# Patient Record
Sex: MALE | Race: WHITE | NOT HISPANIC OR LATINO | URBAN - METROPOLITAN AREA
[De-identification: names, ages, dates, MRNs, and addresses within clinical notes are randomized per-mention and may not be internally consistent; named-entity substitution may affect disease eponyms.]

---

## 2018-01-05 ENCOUNTER — EMERGENCY (EMERGENCY)
Facility: HOSPITAL | Age: 48
LOS: 1 days | Discharge: HOME | End: 2018-01-05
Attending: EMERGENCY MEDICINE | Admitting: EMERGENCY MEDICINE
Payer: COMMERCIAL

## 2018-01-05 VITALS
TEMPERATURE: 98 F | HEART RATE: 56 BPM | RESPIRATION RATE: 16 BRPM | WEIGHT: 188.05 LBS | DIASTOLIC BLOOD PRESSURE: 80 MMHG | OXYGEN SATURATION: 96 % | SYSTOLIC BLOOD PRESSURE: 128 MMHG

## 2018-01-05 VITALS
DIASTOLIC BLOOD PRESSURE: 70 MMHG | RESPIRATION RATE: 16 BRPM | SYSTOLIC BLOOD PRESSURE: 110 MMHG | OXYGEN SATURATION: 99 % | TEMPERATURE: 98 F | HEART RATE: 55 BPM

## 2018-01-05 LAB
ALBUMIN SERPL ELPH-MCNC: 3.8 G/DL — SIGNIFICANT CHANGE UP (ref 3.3–5)
ALP SERPL-CCNC: 49 U/L — SIGNIFICANT CHANGE UP (ref 30–120)
ALT FLD-CCNC: 46 U/L DA — SIGNIFICANT CHANGE UP (ref 10–60)
ANION GAP SERPL CALC-SCNC: 8 MMOL/L — SIGNIFICANT CHANGE UP (ref 5–17)
AST SERPL-CCNC: 25 U/L — SIGNIFICANT CHANGE UP (ref 10–40)
BASOPHILS # BLD AUTO: 0.1 K/UL — SIGNIFICANT CHANGE UP (ref 0–0.2)
BASOPHILS NFR BLD AUTO: 1.8 % — SIGNIFICANT CHANGE UP (ref 0–2)
BILIRUB SERPL-MCNC: 0.2 MG/DL — SIGNIFICANT CHANGE UP (ref 0.2–1.2)
BUN SERPL-MCNC: 17 MG/DL — SIGNIFICANT CHANGE UP (ref 7–23)
CALCIUM SERPL-MCNC: 9.2 MG/DL — SIGNIFICANT CHANGE UP (ref 8.4–10.5)
CHLORIDE SERPL-SCNC: 104 MMOL/L — SIGNIFICANT CHANGE UP (ref 96–108)
CK MB BLD-MCNC: 0.8 % — SIGNIFICANT CHANGE UP (ref 0–3.5)
CK MB CFR SERPL CALC: 1.2 NG/ML — SIGNIFICANT CHANGE UP (ref 0–3.6)
CK SERPL-CCNC: 153 U/L — SIGNIFICANT CHANGE UP (ref 39–308)
CO2 SERPL-SCNC: 28 MMOL/L — SIGNIFICANT CHANGE UP (ref 22–31)
CREAT SERPL-MCNC: 0.91 MG/DL — SIGNIFICANT CHANGE UP (ref 0.5–1.3)
EOSINOPHIL # BLD AUTO: 0.4 K/UL — SIGNIFICANT CHANGE UP (ref 0–0.5)
EOSINOPHIL NFR BLD AUTO: 5.3 % — SIGNIFICANT CHANGE UP (ref 0–6)
GLUCOSE SERPL-MCNC: 92 MG/DL — SIGNIFICANT CHANGE UP (ref 70–99)
HCT VFR BLD CALC: 42.7 % — SIGNIFICANT CHANGE UP (ref 39–50)
HGB BLD-MCNC: 14.1 G/DL — SIGNIFICANT CHANGE UP (ref 13–17)
LYMPHOCYTES # BLD AUTO: 3.2 K/UL — SIGNIFICANT CHANGE UP (ref 1–3.3)
LYMPHOCYTES # BLD AUTO: 45 % — HIGH (ref 13–44)
MCHC RBC-ENTMCNC: 31.1 PG — SIGNIFICANT CHANGE UP (ref 27–34)
MCHC RBC-ENTMCNC: 33 GM/DL — SIGNIFICANT CHANGE UP (ref 32–36)
MCV RBC AUTO: 94.4 FL — SIGNIFICANT CHANGE UP (ref 80–100)
MONOCYTES # BLD AUTO: 0.6 K/UL — SIGNIFICANT CHANGE UP (ref 0–0.9)
MONOCYTES NFR BLD AUTO: 8.4 % — SIGNIFICANT CHANGE UP (ref 2–14)
NEUTROPHILS # BLD AUTO: 2.8 K/UL — SIGNIFICANT CHANGE UP (ref 1.8–7.4)
NEUTROPHILS NFR BLD AUTO: 39.5 % — LOW (ref 43–77)
PLATELET # BLD AUTO: 252 K/UL — SIGNIFICANT CHANGE UP (ref 150–400)
POTASSIUM SERPL-MCNC: 4.2 MMOL/L — SIGNIFICANT CHANGE UP (ref 3.5–5.3)
POTASSIUM SERPL-SCNC: 4.2 MMOL/L — SIGNIFICANT CHANGE UP (ref 3.5–5.3)
PROT SERPL-MCNC: 6.6 G/DL — SIGNIFICANT CHANGE UP (ref 6–8.3)
RBC # BLD: 4.53 M/UL — SIGNIFICANT CHANGE UP (ref 4.2–5.8)
RBC # FLD: 12.2 % — SIGNIFICANT CHANGE UP (ref 10.3–14.5)
SODIUM SERPL-SCNC: 140 MMOL/L — SIGNIFICANT CHANGE UP (ref 135–145)
TROPONIN I SERPL-MCNC: 0 NG/ML — LOW (ref 0.02–0.06)
WBC # BLD: 7.2 K/UL — SIGNIFICANT CHANGE UP (ref 3.8–10.5)
WBC # FLD AUTO: 7.2 K/UL — SIGNIFICANT CHANGE UP (ref 3.8–10.5)

## 2018-01-05 PROCEDURE — 85027 COMPLETE CBC AUTOMATED: CPT

## 2018-01-05 PROCEDURE — 80053 COMPREHEN METABOLIC PANEL: CPT

## 2018-01-05 PROCEDURE — 84484 ASSAY OF TROPONIN QUANT: CPT

## 2018-01-05 PROCEDURE — 36415 COLL VENOUS BLD VENIPUNCTURE: CPT

## 2018-01-05 PROCEDURE — 99285 EMERGENCY DEPT VISIT HI MDM: CPT

## 2018-01-05 PROCEDURE — 82553 CREATINE MB FRACTION: CPT

## 2018-01-05 PROCEDURE — 82550 ASSAY OF CK (CPK): CPT

## 2018-01-05 PROCEDURE — 99283 EMERGENCY DEPT VISIT LOW MDM: CPT | Mod: 25

## 2018-01-05 RX ORDER — LOSARTAN/HYDROCHLOROTHIAZIDE 100MG-25MG
1 TABLET ORAL
Qty: 0 | Refills: 0 | COMMUNITY

## 2018-01-05 RX ORDER — SODIUM CHLORIDE 9 MG/ML
3 INJECTION INTRAMUSCULAR; INTRAVENOUS; SUBCUTANEOUS ONCE
Qty: 0 | Refills: 0 | Status: COMPLETED | OUTPATIENT
Start: 2018-01-05 | End: 2018-01-05

## 2018-01-05 RX ORDER — ASPIRIN/CALCIUM CARB/MAGNESIUM 324 MG
325 TABLET ORAL ONCE
Qty: 0 | Refills: 0 | Status: COMPLETED | OUTPATIENT
Start: 2018-01-05 | End: 2018-01-05

## 2018-01-05 RX ADMIN — SODIUM CHLORIDE 3 MILLILITER(S): 9 INJECTION INTRAMUSCULAR; INTRAVENOUS; SUBCUTANEOUS at 20:05

## 2018-01-05 RX ADMIN — Medication 30 MILLILITER(S): at 20:05

## 2018-01-05 NOTE — ED ADULT TRIAGE NOTE - CHIEF COMPLAINT QUOTE
"I passed out in a restaurant. I think I vasovagalled" Patient had nausea, prior and had 3 drinks prior to the syncope but had not really eaten yet and began to feel nausea and dizziness.

## 2018-01-05 NOTE — ED PROVIDER NOTE - OBJECTIVE STATEMENT
48yo male bib ems s/p syncopal episode. pt states he has hx of vagal syncope, has been worked up in the past with no findings, tonite admits to drinking several glasses of champagne, going out to dinner and then feeling dizzy and lightheaded and passing out, no head trauma, pt woke up with no chest pain, no dizziness, no other compalints

## 2023-02-20 NOTE — ED ADULT TRIAGE NOTE - PAIN: PRESENCE, MLM
Notification via portal   Thank you for following through with the ultrasound of the abdomen.  No longer have fatty liver, your liver is mildly enlarged.  You have gallstones that are not needing intervention.
denies pain/discomfort

## 2023-05-24 ENCOUNTER — EMERGENCY (EMERGENCY)
Facility: HOSPITAL | Age: 53
LOS: 0 days | Discharge: HOME | End: 2023-05-24
Attending: EMERGENCY MEDICINE
Payer: COMMERCIAL

## 2023-05-24 VITALS
TEMPERATURE: 98 F | OXYGEN SATURATION: 96 % | RESPIRATION RATE: 17 BRPM | SYSTOLIC BLOOD PRESSURE: 160 MMHG | DIASTOLIC BLOOD PRESSURE: 94 MMHG | HEART RATE: 78 BPM

## 2023-05-24 VITALS — WEIGHT: 190.04 LBS

## 2023-05-24 DIAGNOSIS — I10 ESSENTIAL (PRIMARY) HYPERTENSION: ICD-10-CM

## 2023-05-24 DIAGNOSIS — T36.4X5A ADVERSE EFFECT OF TETRACYCLINES, INITIAL ENCOUNTER: ICD-10-CM

## 2023-05-24 DIAGNOSIS — R21 RASH AND OTHER NONSPECIFIC SKIN ERUPTION: ICD-10-CM

## 2023-05-24 DIAGNOSIS — A69.20 LYME DISEASE, UNSPECIFIED: ICD-10-CM

## 2023-05-24 DIAGNOSIS — L50.9 URTICARIA, UNSPECIFIED: ICD-10-CM

## 2023-05-24 LAB
ALBUMIN SERPL ELPH-MCNC: 4 G/DL — SIGNIFICANT CHANGE UP (ref 3.3–5)
ALP SERPL-CCNC: 55 U/L — SIGNIFICANT CHANGE UP (ref 40–120)
ALT FLD-CCNC: 31 U/L — SIGNIFICANT CHANGE UP (ref 12–78)
ANION GAP SERPL CALC-SCNC: 3 MMOL/L — LOW (ref 5–17)
AST SERPL-CCNC: 24 U/L — SIGNIFICANT CHANGE UP (ref 15–37)
BASOPHILS # BLD AUTO: 0.03 K/UL — SIGNIFICANT CHANGE UP (ref 0–0.2)
BASOPHILS NFR BLD AUTO: 0.4 % — SIGNIFICANT CHANGE UP (ref 0–2)
BILIRUB SERPL-MCNC: 0.4 MG/DL — SIGNIFICANT CHANGE UP (ref 0.2–1.2)
BUN SERPL-MCNC: 17 MG/DL — SIGNIFICANT CHANGE UP (ref 7–23)
CALCIUM SERPL-MCNC: 9.2 MG/DL — SIGNIFICANT CHANGE UP (ref 8.5–10.1)
CHLORIDE SERPL-SCNC: 105 MMOL/L — SIGNIFICANT CHANGE UP (ref 96–108)
CO2 SERPL-SCNC: 31 MMOL/L — SIGNIFICANT CHANGE UP (ref 22–31)
CREAT SERPL-MCNC: 1.07 MG/DL — SIGNIFICANT CHANGE UP (ref 0.5–1.3)
EGFR: 82 ML/MIN/1.73M2 — SIGNIFICANT CHANGE UP
EOSINOPHIL # BLD AUTO: 0.59 K/UL — HIGH (ref 0–0.5)
EOSINOPHIL NFR BLD AUTO: 7.8 % — HIGH (ref 0–6)
GLUCOSE SERPL-MCNC: 80 MG/DL — SIGNIFICANT CHANGE UP (ref 70–99)
HCT VFR BLD CALC: 41.7 % — SIGNIFICANT CHANGE UP (ref 39–50)
HGB BLD-MCNC: 14.2 G/DL — SIGNIFICANT CHANGE UP (ref 13–17)
IMM GRANULOCYTES NFR BLD AUTO: 0.1 % — SIGNIFICANT CHANGE UP (ref 0–0.9)
LYMPHOCYTES # BLD AUTO: 2.64 K/UL — SIGNIFICANT CHANGE UP (ref 1–3.3)
LYMPHOCYTES # BLD AUTO: 34.8 % — SIGNIFICANT CHANGE UP (ref 13–44)
MCHC RBC-ENTMCNC: 31.5 PG — SIGNIFICANT CHANGE UP (ref 27–34)
MCHC RBC-ENTMCNC: 34.1 GM/DL — SIGNIFICANT CHANGE UP (ref 32–36)
MCV RBC AUTO: 92.5 FL — SIGNIFICANT CHANGE UP (ref 80–100)
MONOCYTES # BLD AUTO: 0.75 K/UL — SIGNIFICANT CHANGE UP (ref 0–0.9)
MONOCYTES NFR BLD AUTO: 9.9 % — SIGNIFICANT CHANGE UP (ref 2–14)
NEUTROPHILS # BLD AUTO: 3.56 K/UL — SIGNIFICANT CHANGE UP (ref 1.8–7.4)
NEUTROPHILS NFR BLD AUTO: 47 % — SIGNIFICANT CHANGE UP (ref 43–77)
PLATELET # BLD AUTO: 332 K/UL — SIGNIFICANT CHANGE UP (ref 150–400)
POTASSIUM SERPL-MCNC: 3.8 MMOL/L — SIGNIFICANT CHANGE UP (ref 3.5–5.3)
POTASSIUM SERPL-SCNC: 3.8 MMOL/L — SIGNIFICANT CHANGE UP (ref 3.5–5.3)
PROT SERPL-MCNC: 7.2 GM/DL — SIGNIFICANT CHANGE UP (ref 6–8.3)
RBC # BLD: 4.51 M/UL — SIGNIFICANT CHANGE UP (ref 4.2–5.8)
RBC # FLD: 13.1 % — SIGNIFICANT CHANGE UP (ref 10.3–14.5)
SODIUM SERPL-SCNC: 139 MMOL/L — SIGNIFICANT CHANGE UP (ref 135–145)
WBC # BLD: 7.58 K/UL — SIGNIFICANT CHANGE UP (ref 3.8–10.5)
WBC # FLD AUTO: 7.58 K/UL — SIGNIFICANT CHANGE UP (ref 3.8–10.5)

## 2023-05-24 PROCEDURE — 96375 TX/PRO/DX INJ NEW DRUG ADDON: CPT

## 2023-05-24 PROCEDURE — 87468 ANAPLSMA PHGCYTOPHLM AMP PRB: CPT

## 2023-05-24 PROCEDURE — 99284 EMERGENCY DEPT VISIT MOD MDM: CPT | Mod: 25

## 2023-05-24 PROCEDURE — 86757 RICKETTSIA ANTIBODY: CPT

## 2023-05-24 PROCEDURE — 87798 DETECT AGENT NOS DNA AMP: CPT

## 2023-05-24 PROCEDURE — 86618 LYME DISEASE ANTIBODY: CPT

## 2023-05-24 PROCEDURE — 99284 EMERGENCY DEPT VISIT MOD MDM: CPT

## 2023-05-24 PROCEDURE — 80053 COMPREHEN METABOLIC PANEL: CPT

## 2023-05-24 PROCEDURE — 87484 EHRLICHA CHAFFEENSIS AMP PRB: CPT

## 2023-05-24 PROCEDURE — 85025 COMPLETE CBC W/AUTO DIFF WBC: CPT

## 2023-05-24 PROCEDURE — 36415 COLL VENOUS BLD VENIPUNCTURE: CPT

## 2023-05-24 PROCEDURE — 96374 THER/PROPH/DIAG INJ IV PUSH: CPT

## 2023-05-24 RX ORDER — DIPHENHYDRAMINE HCL 50 MG
50 CAPSULE ORAL ONCE
Refills: 0 | Status: COMPLETED | OUTPATIENT
Start: 2023-05-24 | End: 2023-05-24

## 2023-05-24 RX ORDER — AMOXICILLIN 250 MG/5ML
500 SUSPENSION, RECONSTITUTED, ORAL (ML) ORAL ONCE
Refills: 0 | Status: COMPLETED | OUTPATIENT
Start: 2023-05-24 | End: 2023-05-24

## 2023-05-24 RX ORDER — AMOXICILLIN 250 MG/5ML
1 SUSPENSION, RECONSTITUTED, ORAL (ML) ORAL
Qty: 28 | Refills: 0
Start: 2023-05-24 | End: 2023-06-06

## 2023-05-24 RX ORDER — FAMOTIDINE 10 MG/ML
20 INJECTION INTRAVENOUS ONCE
Refills: 0 | Status: COMPLETED | OUTPATIENT
Start: 2023-05-24 | End: 2023-05-24

## 2023-05-24 RX ADMIN — Medication 125 MILLIGRAM(S): at 23:39

## 2023-05-24 RX ADMIN — Medication 50 MILLIGRAM(S): at 23:39

## 2023-05-24 RX ADMIN — FAMOTIDINE 20 MILLIGRAM(S): 10 INJECTION INTRAVENOUS at 23:38

## 2023-05-24 RX ADMIN — Medication 500 MILLIGRAM(S): at 23:39

## 2023-05-24 NOTE — ED STATDOCS - CARE PROVIDER_API CALL
Zen Cooper)  Internal Medicine  [address] [id number]  Phone: [phone]  Fax: [phone]  Follow Up Time:     Winston Miles  Infectious Disease  [address] [facility], [address] [address]  Phone: [phone]  Fax: [phone]  Follow Up Time:

## 2023-05-24 NOTE — ED ADULT NURSE NOTE - NSFALLUNIVINTERV_ED_ALL_ED
Bed/Stretcher in lowest position, wheels locked, appropriate side rails in place/Call bell, personal items and telephone in reach/Instruct patient to call for assistance before getting out of bed/chair/stretcher/Non-slip footwear applied when patient is off stretcher/[place] to call system/Physically safe environment - no spills, clutter or unnecessary equipment/Purposeful proactive rounding/Room/bathroom lighting operational, light cord in reach

## 2023-05-24 NOTE — ED STATDOCS - CLINICAL SUMMARY MEDICAL DECISION MAKING FREE TEXT BOX
Concerned for tick-borne illness due to round ring after bug bite. With secondary allergic reaction after starting doxycycline. Will treat with IV benadryl/solumedrol/pepcid in ED, will send labs for tick-borne illness.

## 2023-05-24 NOTE — ED ADULT TRIAGE NOTE - CHIEF COMPLAINT QUOTE
Pt. presents to ED c/o hives. Pt. went to UC earlier this week due to a bite on his leg. Was given oral abx and topical steroid. Pt. reports hives started shortly after, tried to contact PMD and UC however did not get a response back so came to ED. Unsure what his allergy is to. Denies respiratory symptoms

## 2023-05-24 NOTE — ED STATDOCS - PATIENT PORTAL LINK FT
You can access the FollowMyHealth Patient Portal offered by [facility] by registering at the following website: [email or web address]. By joining [employer]’s FollowMyHealth portal, you will also be able to view your health information using other applications (apps) compatible with our system.

## 2023-05-24 NOTE — ED STATDOCS - SKIN, MLM
5cm circular ring on left upper hip and buttock with a central clearing. Diffuse urticarial rash on torso, upper and lower extremities. 5cm circular ring on left upper hip and buttock with a central clearing (erythema migrans rash). Diffuse urticarial rash on torso, upper and lower extremities. No rash on palms or soles

## 2023-05-24 NOTE — ED STATDOCS - NSFOLLOWUPINSTRUCTIONS_ED_ALL_ED_FT
STOP doxycycline.   Tick studies pending.  We will call with results.   Start amoxicillin 500mg twice daily for 14 days, started for suspected lyme due to erythema migrans rash.     For allergic reaction, take benadryl 1-2 tabs every 6 hours as needed for itching or rash, take pepcid AC 20mg daily X 5 days, take prednisone 60mg daily X 5 days.    Lyme Disease  Lyme disease is an infection that can affect many parts of the body, including the skin, joints, and nervous system. It is a bacterial infection that starts from the bite of an infected tick. Over time, the infection can worsen, and some of the symptoms are similar to the flu. If Lyme disease is not treated, it may cause joint pain, swelling, numbness, problems thinking, fatigue, muscle weakness, and other problems.    What are the causes?  This condition is caused by bacteria called Borrelia burgdorferi.  You can get Lyme disease by being bitten by an infected tick.  Only black-legged, or Ixodes, ticks that are infected with the bacteria can cause Lyme disease.  The tick must be attached to your skin for a certain period of time to pass along the infection. This is usually 36–48 hours.  Deer often carry infected ticks.  What increases the risk?  The following factors may make you more likely to develop this condition:  Living in or visiting these areas in the [place]  [place].  [place].  [place].  Spending time in wooded or grassy areas.  Being outdoors with exposed skin.  Camping, gardening, hiking, fishing, hunting, or working outdoors.  Failing to remove a tick from your skin.  What are the signs or symptoms?    Symptoms of this condition may include:  Chills and fever.  Headache.  Fatigue.  General achiness.  Muscle pain.  Joint pain, often in the knees.  A round, red rash that surrounds the center of the tick bite. The center of the rash may be blood colored or have tiny blisters.  Swollen lymph glands.  Stiff neck.  How is this diagnosed?  This condition is diagnosed based on:  Your symptoms and medical history.  A physical exam.  A blood test.  How is this treated?  The main treatment for this condition is antibiotic medicine, which is usually taken by mouth (orally).  The length of treatment depends on how soon after a tick bite you begin taking the medicine. In some cases, treatment is necessary for several weeks.  If the infection is severe, antibiotics may need to be given through an IV that is inserted into one of your veins.  Follow these instructions at home:  Take over-the-counter and prescription medicines only as told by your health care provider. Finish all antibiotic medicine, even when you start to feel better.  Ask your health care provider about taking a probiotic in between doses of your antibiotic to help avoid an upset stomach or diarrhea.  Check with your health care provider before supplementing your treatment. Many alternative therapies have not been proven and may be harmful to you.  Keep all follow-up visits as told by your health care provider. This is important.  How is this prevented?    You can become reinfected if you get another tick bite from an infected tick. Take these steps to help prevent an infection:  Cover your skin with light-colored clothing when you are outdoors in the spring and summer months.  Spray clothing and skin with bug spray. The spray should be 20–30% DEET. You can also treat clothing with permethrin, and let it dry before you wear it. Do not apply permethrin directly to your skin. Permethrin can also be used to treat camping gear and boots. Always read and follow the instructions that come with a bug spray or insecticide.  Avoid wooded, grassy, and shaded areas.  Remove yard litter, brush, trash, and plants that attract deer and rodents.  Check yourself for ticks when you come indoors.  Wash clothing worn each day.  Shower after spending time outdoors.  Check your pets for ticks before they come inside.  If you find a tick attached to your skin:  Remove it with tweezers.  Clean your hands and the bite area with rubbing alcohol or soap and water.  Dispose of the tick by putting it in rubbing alcohol, putting it in a sealed bag or container, or flushing it down the toilet.  You may choose to save the tick in a sealed container if you wish for it to be tested at a later time.  Pregnant women should take special care to avoid tick bites because it is possible that the infection may be passed along to the fetus.    Contact a health care provider if:  You have symptoms after treatment.  You have removed a tick and want to bring it to your health care provider for testing.  Get help right away if:  You have an irregular heartbeat.  You have chest pain.  You have nerve pain.  Your face feels numb.  You develop the following:  A stiff neck.  A severe headache.  Severe nausea and vomiting.  Sensitivity to light.  Summary  Lyme disease is an infection that can affect many parts of the body, including the skin, joints, and nervous system.  This condition is caused by bacteria called Borrelia burgdorferi.  You can get Lyme disease by being bitten by an infected tick.  The main treatment for this condition is antibiotic medicine.  This information is not intended to replace advice given to you by your health care provider. Make sure you discuss any questions you have with your health care provider.    Document Revised: 04/10/2020 Document Reviewed: 03/05/2020  [employer] Patient Education © 2023 [employer]        English    Hives  Hives are itchy, red, swollen areas on your skin. Hives can show up on any part of your body. Hives often fade within 24 hours (acute hives). New hives can show up after old ones fade. This can go on for many days or weeks (chronic hives). Hives do not spread from person to person (are not contagious).    Hives are caused by your body's response to something that you are allergic to (allergen). These are sometimes called triggers. You can get hives right after being around a trigger, or hours later.    What are the causes?  Allergies to foods.  Insect bites or stings.  Exposure to pollen or pets.  Spending time in sunlight, heat, or cold.  Exercise.  Stress.  You can also get hives from other medical conditions and treatments, such as:  Some medicines.  Chemicals or latex.  Viruses. This includes the common cold.  Infections caused by germs (bacteria).  Allergy shots.  Blood transfusions.  Sometimes, the cause is not known.    What increases the risk?  Being a woman.  Being allergic to foods such as:  Citrus fruits.  Milk.  Eggs.  Peanuts.  Tree nuts.  Shellfish.  Being allergic to:  Medicines.  Latex.  Insects.  Animals.  Pollen.  What are the signs or symptoms?  A red rash on a person's upper arm.  Raised, itchy, red or white bumps or patches on your skin. These areas may:  Get large and swollen.  Change in shape and location.  Stand alone or connect to each other over a large area of skin.  Sting or hurt.  Turn white when pressed in the center (nakul).  In very bad cases, your hands, feet, and face may also get swollen. This may happen if hives start deeper in your skin.    How is this treated?  Treatment for this condition depends on your symptoms. Treatment may include:  Using cool, wet cloths (cool compresses) or taking cool showers to stop the itching.  Medicines that help:  Relieve itching (antihistamines).  Reduce swelling (corticosteroids).  Treat infection (antibiotics).  A medicine (omalizumab) that is given as a shot (injection). Your doctor may prescribe this if you have hives that do not get better even after other treatments.  In very bad cases, you may need a shot of a medicine called epinephrine to prevent a life-threatening allergic reaction (anaphylaxis).  Follow these instructions at home:  Medicines    Take or apply over-the-counter and prescription medicines only as told by your doctor.  If you were prescribed an antibiotic medicine, use it as told by your doctor. Do not stop using it even if you start to feel better.  Skin care    Apply cool, wet cloths to the hives.  Do not scratch your skin. Do not rub your skin.  General instructions    Do not take hot showers or baths. This can make itching worse.  Do not wear tight clothes.  Use sunscreen and wear clothes that cover your skin when you are outside.  Avoid any triggers that cause your hives. Keep a journal to help track what causes your hives. Write down:  What medicines you take.  What you eat and drink.  What products you use on your skin.  Keep all follow-up visits as told by your doctor. This is important.  Contact a doctor if:  Your symptoms are not better with medicine.  Your joints hurt or are swollen.  Get help right away if:  You have a fever.  You have pain in your belly (abdomen).  Your tongue or lips are swollen.  Your eyelids are swollen.  Your chest or throat feels tight.  You have trouble breathing or swallowing.  These symptoms may be an emergency. Do not wait to see if the symptoms will go away. Get medical help right away. Call your local emergency services (911 in the [place] Do not drive yourself to the hospital.    Summary  Hives are itchy, red, swollen areas on your skin.  Treatment for this condition depends on your symptoms.  Avoid things that cause your hives. Keep a journal to help track what causes your hives.  Take and apply over-the-counter and prescription medicines only as told by your doctor.  Get help right away if your chest or throat feels tight or if you have trouble breathing or swallowing.  This information is not intended to replace advice given to you by your health care provider. Make sure you discuss any questions you have with your health care provider.    Document Revised: 02/04/2022 Document Reviewed: 02/06/2022  [employer] Patient Education © 2023 [employer]

## 2023-05-24 NOTE — ED STATDOCS - CARE PLAN
1 Principal Discharge DX:	Allergic drug reaction  Secondary Diagnosis:	Erythema migrans (Lyme disease)

## 2023-05-24 NOTE — ED STATDOCS - OBJECTIVE STATEMENT
53 y/o male with PMHx of HTN presents to the ED c/o hives. Pt reports he went to  1 week ago after noticing an itchy and painful bump/possible bug bite on his right hip, which he believed he sustained 2 weeks ago. Pt was prescribed doxycycline and started to develop rash on 5/22, since then has stopped taking it. Rash has been worsening despite stopping antibiotic. Took Benadryl with no relief. Was also given topical hydrocortisone ointment, which he has also stopped. Took Advil 1 hour PTA.

## 2023-05-25 PROBLEM — R55 SYNCOPE AND COLLAPSE: Chronic | Status: ACTIVE | Noted: 2018-01-05

## 2023-05-25 PROBLEM — I10 ESSENTIAL (PRIMARY) HYPERTENSION: Chronic | Status: ACTIVE | Noted: 2018-01-05

## 2023-05-25 LAB
B BURGDOR C6 AB SER-ACNC: NEGATIVE — SIGNIFICANT CHANGE UP
B BURGDOR IGG+IGM SER QL IB: SIGNIFICANT CHANGE UP
B BURGDOR IGG+IGM SER-ACNC: 0.13 INDEX — SIGNIFICANT CHANGE UP (ref 0.01–0.89)
BABESIA MICROTI PCR, BLD RESULT: SIGNIFICANT CHANGE UP

## 2023-05-25 NOTE — ED POST DISCHARGE NOTE - REASON FOR FOLLOW-UP
Pt was asking for lyme test. Informed him that the results for lyme were negative but still waiting for the rest of the tick bore panel. -Pernell Andrade PA-C Other

## 2023-05-27 LAB
A PHAGOCYTOPH DNA BLD QL NAA+PROBE: NEGATIVE — SIGNIFICANT CHANGE UP
E CHAFFEENSIS DNA BLD QL NAA+PROBE: NEGATIVE — SIGNIFICANT CHANGE UP
E EWINGII DNA SPEC QL NAA+PROBE: NEGATIVE — SIGNIFICANT CHANGE UP
EHRLICHIA DNA SPEC QL NAA+PROBE: NEGATIVE — SIGNIFICANT CHANGE UP

## 2023-06-01 LAB
R RICKETTSI AB SER-ACNC: NEGATIVE — SIGNIFICANT CHANGE UP
R RICKETTSI IGM SER-ACNC: 0.4 INDEX — SIGNIFICANT CHANGE UP (ref 0–0.89)
RICK SF IGG TITR SER IF: NEGATIVE — SIGNIFICANT CHANGE UP
RICK SF IGM TITR SER IF: 0.4 INDEX — SIGNIFICANT CHANGE UP (ref 0–0.89)

## 2025-05-28 ENCOUNTER — TRANSCRIPTION ENCOUNTER (OUTPATIENT)
Age: 55
End: 2025-05-28

## 2025-05-28 ENCOUNTER — APPOINTMENT (OUTPATIENT)
Dept: ORTHOPEDIC SURGERY | Facility: CLINIC | Age: 55
End: 2025-05-28
Payer: COMMERCIAL

## 2025-05-28 DIAGNOSIS — G89.29 LOW BACK PAIN, UNSPECIFIED: ICD-10-CM

## 2025-05-28 DIAGNOSIS — M54.50 LOW BACK PAIN, UNSPECIFIED: ICD-10-CM

## 2025-05-28 PROBLEM — Z00.00 ENCOUNTER FOR PREVENTIVE HEALTH EXAMINATION: Status: ACTIVE | Noted: 2025-05-28

## 2025-05-28 PROCEDURE — 72110 X-RAY EXAM L-2 SPINE 4/>VWS: CPT

## 2025-05-28 PROCEDURE — 99204 OFFICE O/P NEW MOD 45 MIN: CPT

## 2025-05-30 ENCOUNTER — TRANSCRIPTION ENCOUNTER (OUTPATIENT)
Age: 55
End: 2025-05-30